# Patient Record
Sex: FEMALE | Race: WHITE
[De-identification: names, ages, dates, MRNs, and addresses within clinical notes are randomized per-mention and may not be internally consistent; named-entity substitution may affect disease eponyms.]

---

## 2018-02-11 ENCOUNTER — HOSPITAL ENCOUNTER (EMERGENCY)
Dept: HOSPITAL 31 - C.ER | Age: 38
Discharge: HOME | End: 2018-02-11
Payer: COMMERCIAL

## 2018-02-11 VITALS
DIASTOLIC BLOOD PRESSURE: 72 MMHG | RESPIRATION RATE: 22 BRPM | SYSTOLIC BLOOD PRESSURE: 110 MMHG | TEMPERATURE: 99 F | HEART RATE: 106 BPM

## 2018-02-11 VITALS — OXYGEN SATURATION: 98 %

## 2018-02-11 DIAGNOSIS — I10: ICD-10-CM

## 2018-02-11 DIAGNOSIS — E05.90: ICD-10-CM

## 2018-02-11 DIAGNOSIS — J11.1: Primary | ICD-10-CM

## 2018-02-11 DIAGNOSIS — J98.01: ICD-10-CM

## 2018-02-11 NOTE — RAD
HISTORY:

PRODUCTIVE COUGH, FEVER  



COMPARISON:

None available. 



TECHNIQUE:

Chest PA and lateral



FINDINGS:

Examination limited by habitus.



LUNGS:

No focal consolidation.



Please note that chest x-ray has limited sensitivity for the 

detection of pulmonary masses.



PLEURA:

No significant pleural effusion identified. No definite pneumothorax .



CARDIOVASCULAR:

The cardiomediastinal silhouette appears within normal limits of 

size. 



OSSEOUS STRUCTURES:

 No acute osseous abnormality identified.



VISUALIZED UPPER ABDOMEN:

Unremarkable.



OTHER FINDINGS:

None.



IMPRESSION:

No focal consolidation identified.

## 2018-02-11 NOTE — C.PDOC
History Of Present Illness


37 yr old female presents to the ER with complaints of runny nose, sore throat 

and dry cough for 2 weeks but states yesterday she started having fever, body 

aches, headaches, light headed, nausea and vomiting, with SOB at night when 

laying flat. Patient states her  was initially sick. Patient denies 

history of asthma, chest pain, abdominal pain, diarrhea, dysuria, weakness or 

numbness.


Time Seen by Provider: 02/11/18 09:30


Chief Complaint (Nursing): Flu-like Symptoms


History Per: Patient


History/Exam Limitations: no limitations


Onset/Duration Of Symptoms: Days


Sick Contacts (Context): Family Member(s) ()





Past Medical History


Reviewed: Historical Data, Nursing Documentation, Vital Signs


Vital Signs: 


 Last Vital Signs











Temp  100.2 F H  02/11/18 09:07


 


Pulse  116 H  02/11/18 09:07


 


Resp  20   02/11/18 09:07


 


BP  111/76   02/11/18 09:07


 


Pulse Ox  98   02/11/18 11:32














- Medical History


PMH: HTN, Hyperthyroidism


Family History: States: No Known Family Hx





- Social History


Hx Tobacco Use: No


Hx Alcohol Use: No


Hx Substance Use: No





- Immunization History


Hx Tetanus Toxoid Vaccination: Yes


Hx Influenza Vaccination: No


Hx Pneumococcal Vaccination: No





Review Of Systems


Except As Marked, All Systems Reviewed And Found Negative.


Constitutional: Positive for: Fever (subjective), Other (+ body aches)


ENT: Positive for: Throat Pain (sore throat)


Cardiovascular: Positive for: Light Headedness.  Negative for: Chest Pain


Respiratory: Positive for: Cough (dry), Shortness of Breath (when laying flat 

at night)


Gastrointestinal: Positive for: Nausea, Vomiting.  Negative for: Abdominal Pain

, Diarrhea


Genitourinary: Negative for: Dysuria


Neurological: Positive for: Headache.  Negative for: Weakness, Numbness





Physical Exam





- Physical Exam


Appears: Non-toxic, In Acute Distress (uncomfortable)


Skin: Warm, Dry, No Rash


Eye(s): bilateral: Normal Inspection, PERRL, EOMI


Ear(s): Bilateral: Normal


Oral Mucosa: Moist


Lips: Normal Appearing


Throat: Erythema, No Exudate, Other (no tonsil swelling, speaking in full 

sentences)


Neck: Normal, Normal ROM, Supple


Cardiovascular: Rhythm Regular (tachy)


Respiratory: Decreased Breath Sounds (mild), No Rhonchi, No Wheezing


Gastrointestinal/Abdominal: Normal Exam, Soft, No Tenderness, No Guarding, No 

Rebound


Extremity: Normal ROM, No Swelling


Neurological/Psych: Oriented x3, Normal Speech





ED Course And Treatment


O2 Sat by Pulse Oximetry: 98 (RA)


Pulse Ox Interpretation: Normal





- Radiology


CXR: Interpreted by Me, Viewed By Me


CXR Interpretation: Yes: No Acute Disease.  No: Infiltrates


Progress Note: PLAN: CXR, Albuterol IH, Tamiflu PO, Tyelnol PO, Prednisone PO & 

Reeval.  Patient is wheezing and given prednisone. CXR was done to rule out PE.





Disposition


Counseled Patient/Family Regarding: Diagnosis, Need For Followup, Rx Given





- Disposition


Referrals: 


[facility] [Outside]


Disposition: HOME/ ROUTINE


Disposition Time: 11:30


Condition: STABLE


Additional Instructions: 


FOLLOW UP WITH YOUR DOCTOR/CLINIC IN 1-2 DAYS





USE MEDICATIONS AS DIRECTED





DRINK PLENTY OF FLUIDS





RETURN TO ER IF SYMPTOMS WORSEN 


Prescriptions: 


Albuterol HFA [Ventolin HFA 90 mcg/actuation (8 g)] 0.09 mg IH Q4 PRN #1 puff


 PRN Reason: Wheezing


Naproxen 375 mg PO BID PRN #20 tablet


 PRN Reason: pain


Oseltamivir Phosphate [Tamiflu] 75 mg PO BID #10 capsule


predniSONE [predniSONE Tab] 40 mg PO DAILY #6 tab


Instructions:  Viral Syndrome (ED), Bronchospasm (DC)


Forms:  [facility] (English), Work Excuse


Print Language: ENGLISH





- Clinical Impression


Clinical Impression: 


 Influenza-like illness, Bronchospasm








- Scribe Statement


The provider has reviewed the documentation as recorded by the Poonam Echeverria


Provider Attestation: 


All medical record entries made by the Poonam were at my direction and 

personally dictated by me. I have reviewed the chart and agree that the record 

accurately reflects my personal performance of the history, physical exam, 

medical decision making, and the department course for this patient. I have 

also personally directed, reviewed, and agree with the discharge instructions 

and disposition.